# Patient Record
Sex: FEMALE | Race: WHITE | URBAN - METROPOLITAN AREA
[De-identification: names, ages, dates, MRNs, and addresses within clinical notes are randomized per-mention and may not be internally consistent; named-entity substitution may affect disease eponyms.]

---

## 2021-06-01 ENCOUNTER — DOCUMENTATION ONLY (OUTPATIENT)
Dept: FAMILY MEDICINE CLINIC | Age: 61
End: 2021-06-01

## 2021-06-01 ENCOUNTER — OFFICE VISIT (OUTPATIENT)
Dept: FAMILY MEDICINE CLINIC | Age: 61
End: 2021-06-01
Payer: MEDICAID

## 2021-06-01 ENCOUNTER — HOSPITAL ENCOUNTER (OUTPATIENT)
Dept: LAB | Age: 61
Discharge: HOME | End: 2021-06-01
Payer: MEDICAID

## 2021-06-01 VITALS
BODY MASS INDEX: 28.43 KG/M2 | OXYGEN SATURATION: 99 % | RESPIRATION RATE: 16 BRPM | TEMPERATURE: 98 F | DIASTOLIC BLOOD PRESSURE: 89 MMHG | HEIGHT: 59 IN | HEART RATE: 77 BPM | WEIGHT: 141 LBS | SYSTOLIC BLOOD PRESSURE: 126 MMHG

## 2021-06-01 DIAGNOSIS — Z79.899 CONTROLLED SUBSTANCE AGREEMENT SIGNED: ICD-10-CM

## 2021-06-01 DIAGNOSIS — F31.9 BIPOLAR 1 DISORDER (HCC): Primary | ICD-10-CM

## 2021-06-01 DIAGNOSIS — J44.9 CHRONIC OBSTRUCTIVE PULMONARY DISEASE, UNSPECIFIED COPD TYPE (HCC): ICD-10-CM

## 2021-06-01 DIAGNOSIS — G89.29 OTHER CHRONIC PAIN: ICD-10-CM

## 2021-06-01 DIAGNOSIS — F41.0 PANIC ATTACK: ICD-10-CM

## 2021-06-01 DIAGNOSIS — F41.9 ANXIETY: ICD-10-CM

## 2021-06-01 PROCEDURE — 80307 DRUG TEST PRSMV CHEM ANLYZR: CPT

## 2021-06-01 PROCEDURE — 99204 OFFICE O/P NEW MOD 45 MIN: CPT | Performed by: NURSE PRACTITIONER

## 2021-06-01 RX ORDER — FLUTICASONE FUROATE AND VILANTEROL TRIFENATATE 100; 25 UG/1; UG/1
1 POWDER RESPIRATORY (INHALATION) DAILY
Qty: 1 INHALER | Refills: 0 | Status: SHIPPED | OUTPATIENT
Start: 2021-06-01

## 2021-06-01 RX ORDER — FLUTICASONE PROPIONATE AND SALMETEROL 500; 50 UG/1; UG/1
1 POWDER RESPIRATORY (INHALATION) EVERY 12 HOURS
COMMUNITY
End: 2021-12-30 | Stop reason: SDUPTHER

## 2021-06-01 RX ORDER — TIZANIDINE HYDROCHLORIDE 4 MG/1
4 CAPSULE, GELATIN COATED ORAL
COMMUNITY

## 2021-06-01 RX ORDER — AZITHROMYCIN 250 MG/1
TABLET, FILM COATED ORAL
Qty: 6 TABLET | Refills: 0 | Status: SHIPPED | OUTPATIENT
Start: 2021-06-01 | End: 2021-06-06

## 2021-06-01 RX ORDER — OXYCODONE 13.5 MG/1
13.5 CAPSULE, EXTENDED RELEASE ORAL 2 TIMES DAILY
COMMUNITY
Start: 2021-05-20

## 2021-06-01 RX ORDER — FLUOXETINE HYDROCHLORIDE 40 MG/1
CAPSULE ORAL 2 TIMES DAILY
COMMUNITY

## 2021-06-01 RX ORDER — ALPRAZOLAM 1 MG/1
TABLET ORAL
COMMUNITY
End: 2021-12-15 | Stop reason: SDUPTHER

## 2021-06-01 RX ORDER — FLUTICASONE FUROATE AND VILANTEROL TRIFENATATE 100; 25 UG/1; UG/1
1 POWDER RESPIRATORY (INHALATION) 2 TIMES DAILY
COMMUNITY

## 2021-06-01 RX ORDER — QUETIAPINE FUMARATE 50 MG/1
100 TABLET, FILM COATED ORAL
COMMUNITY
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
## 2021-06-01 NOTE — PROGRESS NOTES
Call patient - let her know Pallavi Olsenr not covered by her insurance so she can just take the Advair she has at home BID for now until she sees pulm in a few weeks.

## 2021-06-02 LAB
AMPHETAMINE SCREEN, URINE, 701828: ABNORMAL NG/ML
AMPHETAMINES UR QL SCN: NEGATIVE NG/ML
BARBITURATES SCREEN, URINE, 701831: ABNORMAL NG/ML
BARBITURATES UR QL SCN: NEGATIVE NG/ML
BENZODIAZ UR QL SCN: NEGATIVE NG/ML
BENZODIAZEPINES SCREEN, URINE, 701832: ABNORMAL NG/ML
BUPRENORPHINE UR QL: NEGATIVE NG/ML
BUPRENORPHINE, URINE: ABNORMAL NG/ML
BZE UR QL SCN: NEGATIVE NG/ML
CANNABINOID SCREEN, URINE, 701833: ABNORMAL NG/ML
CANNABINOIDS UR QL SCN: NEGATIVE NG/ML
COCAINE METAB. SCREEN, URINE, 701834: ABNORMAL NG/ML
CREAT UR-MCNC: 66.3 MG/DL (ref 20–300)
METHADONE SCREEN, URINE, 701837: ABNORMAL NG/ML
METHADONE UR QL SCN: NEGATIVE NG/ML
OPIATE SCREEN, URINE, 701835: ABNORMAL NG/ML
OPIATES UR QL SCN: POSITIVE NG/ML
OXYCODONE+OXYMORPHONE UR QL SCN: POSITIVE NG/ML
OXYCODONE/OXYMORPHONE, URINE, 701858: ABNORMAL NG/ML
PCP UR QL: NEGATIVE NG/ML
PH UR: 6.5 [PH] (ref 4.5–8.9)
PHENCYCLIDINE SCREEN, URINE, 701836: ABNORMAL NG/ML
PLEASE NOTE:, 733163: ABNORMAL
PROPOXYPH UR QL SCN: NEGATIVE NG/ML
PROPOXYPHENE SCREEN, URINE, 701838: ABNORMAL NG/ML

## 2021-06-03 DIAGNOSIS — F41.9 ANXIETY: Primary | ICD-10-CM

## 2021-06-03 RX ORDER — ALPRAZOLAM 1 MG/1
1 TABLET ORAL
Qty: 20 TABLET | Refills: 0 | Status: SHIPPED | OUTPATIENT
Start: 2021-06-03

## 2021-12-14 ENCOUNTER — NURSE TRIAGE (OUTPATIENT)
Dept: OTHER | Facility: CLINIC | Age: 61
End: 2021-12-14

## 2021-12-14 NOTE — TELEPHONE ENCOUNTER
Received call from [facility] with Red Flag Complaint. Brief description of triage: S/p fall 9 days ago, rib pain, sob and coughing up blood. See below assessment. Triage indicates for patient to be seen in ED - patient does not have family or have the ability to drive, informed patient to call 911. Patient agreeable with plan and will go to ED but would also like to make a follow up appt with PCP. Warm transfer to [facility] for routine follow up scheduling per patient request.     Care advice provided, patient verbalizes understanding; denies any other questions or concerns; instructed to call back for any new or worsening symptoms. Attention Provider: Thank you for allowing me to participate in the care of your patient. The patient was connected to triage in response to information provided to the ECC. Please do not respond through this encounter as the response is not directed to a shared pool. Reason for Disposition   Coughing or spitting up blood    Answer Assessment - Initial Assessment Questions  1. MECHANISM: \"How did the injury happen? \"       had a fall when she hit the back of her right rib area on the cabinet    2. ONSET: \"When did the injury happen? \" (Minutes or hours ago)      9 days ago    3. LOCATION: \"Where on the chest is the injury located? \"      See above    4. APPEARANCE: \"What does the injury look like? \"      Unsure, hasn't been able to look at it in the mirror, states she thought it was bruised last week. 5. BLEEDING: \"Is there any bleeding now? If so, ask: How long has it been bleeding? \"      Denies    6. SEVERITY: Emmet Schilder difficulty with breathing? \"      Yes, states that she is having a hard time taking deep breath in because of the pain, pain is located right over area where she hit when she fell. She states that three days after she fell she began coughing up very dark brown sputum that looked like blood for about 4-5 days after.  Not having currently. States she has hx of COPD but this is related to the pain not her COPD    7. SIZE: For cuts, bruises, or swelling, ask: \"How large is it? \" (e.g., inches or centimeters)      Unsure    8. PAIN: \"Is there pain? \" If so, ask: \"How bad is the pain? \"   (e.g., Scale 1-10; or mild, moderate, severe)      12     9. TETNUS: For any breaks in the skin, ask: \"When was the last tetanus booster? \"      NA    10. PREGNANCY: \"Is there any chance you are pregnant? \" \"When was your last menstrual period? \"        NA    Protocols used: CHEST INJURY-ADULT-[place]

## 2021-12-15 ENCOUNTER — TELEPHONE (OUTPATIENT)
Dept: FAMILY MEDICINE CLINIC | Age: 61
End: 2021-12-15

## 2021-12-15 ENCOUNTER — OFFICE VISIT (OUTPATIENT)
Dept: FAMILY MEDICINE CLINIC | Age: 61
End: 2021-12-15

## 2021-12-15 VITALS
TEMPERATURE: 97.6 F | SYSTOLIC BLOOD PRESSURE: 108 MMHG | OXYGEN SATURATION: 99 % | HEIGHT: 59 IN | BODY MASS INDEX: 29.84 KG/M2 | DIASTOLIC BLOOD PRESSURE: 76 MMHG | HEART RATE: 74 BPM | WEIGHT: 148 LBS | RESPIRATION RATE: 16 BRPM

## 2021-12-15 DIAGNOSIS — F41.9 ANXIETY: ICD-10-CM

## 2021-12-15 DIAGNOSIS — S20.211A RIB CONTUSION, RIGHT, INITIAL ENCOUNTER: Primary | ICD-10-CM

## 2021-12-15 PROCEDURE — 99213 OFFICE O/P EST LOW 20 MIN: CPT | Performed by: NURSE PRACTITIONER

## 2021-12-15 RX ORDER — HYDROCODONE BITARTRATE AND ACETAMINOPHEN 5; 325 MG/1; MG/1
1 TABLET ORAL
Qty: 21 TABLET | Refills: 0 | Status: SHIPPED | OUTPATIENT
Start: 2021-12-15 | End: 2021-12-22

## 2021-12-15 RX ORDER — IBUPROFEN 800 MG/1
800 TABLET ORAL
Qty: 30 TABLET | Refills: 0 | Status: SHIPPED | OUTPATIENT
Start: 2021-12-15 | End: 2021-12-30

## 2021-12-15 RX ORDER — ALPRAZOLAM 1 MG/1
1 TABLET ORAL
Qty: 30 TABLET | Refills: 0 | Status: SHIPPED | OUTPATIENT
Start: 2021-12-15

## 2021-12-15 NOTE — TELEPHONE ENCOUNTER
Ardella Oppenheim from [employer] called on behalf of the pt. She would like to speak to [facility] in regards to medication that was prescribed to the pt today (hydrocodone). Ardella Oppenheim stated that the pt is taking suboxone as well. Ardella Oppenheim thinks that something doesn't seem right due to the pt switching pharmacies. Ardella Oppenheim wants to know if [facility] is aware .  Please advise

## 2021-12-15 NOTE — TELEPHONE ENCOUNTER
Please advise pharmacy that patient is no longer on suboxone and she switched pharmacies because she recently moved.

## 2021-12-15 NOTE — TELEPHONE ENCOUNTER
Bev from [facility] called on behalf of the pt. She would like to speak to Susan in regards to medication that was prescribed to the pt today (hydrocodone). Bev stated that the pt is taking suboxone as well. Bev thinks that something doesn't seem right due to the pt switching pharmacies. Bev wants to know if Susan is aware . Please advise

## 2021-12-15 NOTE — PROGRESS NOTES
CESAR Clark is a 57y.o. year old female who presents today for follow up from ER visit yesterday. About 10 days ago fell in her kitchen backwards into one of her cabinets and hit the right side of her back. She has been in a lot of pain since then and still has swelling in her back. She was directed by nursing triage to go to the ER yesterday which she did. Chest xray and rib series were unremarkable. She was given Norco for pain relief which helped. Was told to ask her PCP for a few day supply if it helped which it did so she would like that. Hurts when she laughs, takes a deep breath or twists. Has been wearing high waisted leggings which help to splint the pain a little         Past Medical History:   Diagnosis Date    Anxiety     Bipolar 1 disorder ([facility] [address]     History of uterine cancer     Insomnia     Osteoporosis     Restless legs syndrome     Scoliosis        Past Surgical History:   Procedure Laterality Date    HX HYSTERECTOMY      HX OTHER SURGICAL      C3,2 and 4 replacement        Social History     Tobacco Use    Smoking status: Former Smoker     Years: 50.00     Quit date:      Years since quittin.9    Smokeless tobacco: Never Used   Substance Use Topics    Alcohol use: Never    Drug use: Never         Current Outpatient Medications:     FLUoxetine (PROzac) 40 mg capsule, Take  by mouth two (2) times a day., Disp: , Rfl:     QUEtiapine (SEROqueL) 50 mg tablet, Take 100 mg by mouth nightly., Disp: , Rfl:     ALPRAZolam (Xanax) 1 mg tablet, Take  by mouth daily as needed for Anxiety. , Disp: , Rfl:     fluticasone propion-salmeteroL (Advair Diskus) 500-50 mcg/dose diskus inhaler, Take 1 Puff by inhalation every twelve (12) hours. , Disp: , Rfl:     ALPRAZolam (XANAX) 1 mg tablet, Take 1 Tablet by mouth daily as needed for Anxiety. (Patient not taking: Reported on 12/15/2021), Disp: 20 Tablet, Rfl: 0    tiZANidine (ZANAFLEX) 4 mg capsule, Take 4 mg by mouth nightly. 1 to 2 at night (Patient not taking: Reported on 12/15/2021), Disp: , Rfl:     fluticasone furoate-vilanteroL (Breo Ellipta) 100-25 mcg/dose inhaler, Take 1 Puff by inhalation two (2) times a day., Disp: , Rfl:     Xtampza ER 13.5 mg capsule, Take 13.5 mg by mouth two (2) times a day. (Patient not taking: Reported on 12/15/2021), Disp: , Rfl:     fluticasone furoate-vilanteroL (Breo Ellipta) 100-25 mcg/dose inhaler, Take 1 Puff by inhalation daily. (Patient not taking: Reported on 12/15/2021), Disp: 1 Inhaler, Rfl: 0     Allergies   Allergen Reactions    Pcn [Penicillins] Itching       Review of Systems   Constitutional: Negative for chills, fever and malaise/fatigue. Eyes: Negative for blurred vision and double vision. Respiratory: Negative for cough and shortness of breath. Cardiovascular: Negative for chest pain, palpitations and leg swelling. Musculoskeletal: Positive for joint pain (right posterior ribs). Neurological: Negative for dizziness and headaches. PE  /76 (BP 1 Location: Left upper arm, BP Patient Position: Sitting, BP Cuff Size: Adult)   Pulse 74   Temp 97.6 °F (36.4 °C) (Temporal)   Resp 16   Ht 4' 11\" (1.499 m)   Wt 148 lb (67.1 kg)   SpO2 99%   BMI 29.89 kg/m²     Physical Exam  Vitals reviewed. Constitutional:       General: She is not in acute distress. Appearance: Normal appearance. HENT:      Head: Normocephalic and atraumatic. Cardiovascular:      Rate and Rhythm: Normal rate and regular rhythm. Heart sounds: S1 normal and S2 normal. No murmur heard. No friction rub. No gallop. No S3 or S4 sounds. Pulmonary:      Effort: Pulmonary effort is normal.      Breath sounds: Normal breath sounds. No wheezing, rhonchi or rales. Musculoskeletal:        Back:       Right lower leg: No edema. Left lower leg: No edema.       Comments: Just larger than grapefruit sized area of soft tissue swelling over right posterior ribs, exquisitely tender to palpation, no ecchymosis   Skin:     General: Skin is warm and dry. Capillary Refill: Capillary refill takes less than 2 seconds. Neurological:      Mental Status: She is alert and oriented to person, place, and time. Assessment/Plan  1.  Rib contusion  norco 5-328 Q8H PRN #21 NR  Ibuprofen 800 Q8H PRN  Ice X20 mins 2-3X/day  Abdominal binder  Splint for coughing, sneezing  FU symptoms worsen or do not improve

## 2021-12-15 NOTE — PROGRESS NOTES
Leana Rodney is a 57 y.o. female (: 1960) presenting to address:    Chief Complaint   Patient presents with   Jono Holguin 21     from a fall       Vitals:    12/15/21 1013   BP: 108/76   Pulse: 74   Resp: 16   Temp: 97.6 °F (36.4 °C)   TempSrc: Temporal   SpO2: 99%   Weight: 148 lb (67.1 kg)   Height: 4' 11\" (1.499 m)   PainSc:  10 - Worst pain ever       Hearing/Vision:   No exam data present    Learning Assessment:   No flowsheet data found. Depression Screening:     3 most recent PHQ Screens 2021   PHQ Not Done Active Diagnosis of Depression or Bipolar Disorder   Little interest or pleasure in doing things Nearly every day   Feeling down, depressed, irritable, or hopeless Nearly every day   Total Score PHQ 2 6   Trouble falling or staying asleep, or sleeping too much Nearly every day   Feeling tired or having little energy Nearly every day   Poor appetite, weight loss, or overeating Nearly every day   Feeling bad about yourself - or that you are a failure or have let yourself or your family down Not at all   Trouble concentrating on things such as school, work, reading, or watching TV Not at all   Moving or speaking so slowly that other people could have noticed; or the opposite being so fidgety that others notice Not at all   Thoughts of being better off dead, or hurting yourself in some way Not at all   PHQ 9 Score 15   How difficult have these problems made it for you to do your work, take care of your home and get along with others Somewhat difficult     Fall Risk Assessment:     Fall Risk Assessment, last 12 mths 2021   Able to walk? Yes   Fall in past 12 months? 1   Do you feel unsteady? 0   Are you worried about falling 0   Is TUG test greater than 12 seconds? 0   Is the gait abnormal? 0   Number of falls in past 12 months 2   Fall with injury? 0     Abuse Screening:     Abuse Screening Questionnaire 2021   Do you ever feel afraid of your partner?  N   Are you in a relationship with someone who physically or mentally threatens you? N   Is it safe for you to go home? Y     ADL Assessment:   No flowsheet data found. Coordination of Care Questionaire:   1. \"Have you been to the ER, urgent care clinic since your last visit? Hospitalized since your last visit? \" Yes Where: [facility] December 14th for bruise on back    2. \"Have you seen or consulted any other health care providers outside of the [address] [place] [address] since your last visit? \" No     3. For patients aged 39-70: Has the patient had a colonoscopy? No     If the patient is female:    4. For patients aged 41-77: Has the patient had a mammogram within the past 2 years? No    5. For patients aged 21-65: Has the patient had a pap smear? No    Advanced Directive:   1. Do you have an Advanced Directive? NO    2. Would you like information on Advanced Directives?  NO

## 2021-12-30 ENCOUNTER — VIRTUAL VISIT (OUTPATIENT)
Dept: FAMILY MEDICINE CLINIC | Age: 61
End: 2021-12-30
Payer: MEDICAID

## 2021-12-30 DIAGNOSIS — J44.1 ACUTE EXACERBATION OF CHRONIC OBSTRUCTIVE PULMONARY DISEASE (COPD) (HCC): Primary | ICD-10-CM

## 2021-12-30 DIAGNOSIS — J30.9 CHRONIC ALLERGIC RHINITIS: ICD-10-CM

## 2021-12-30 DIAGNOSIS — F17.201 MILD TOBACCO USE DISORDER, IN SUSTAINED REMISSION: ICD-10-CM

## 2021-12-30 DIAGNOSIS — G47.34 NOCTURNAL HYPOXIA: ICD-10-CM

## 2021-12-30 PROCEDURE — 99214 OFFICE O/P EST MOD 30 MIN: CPT | Performed by: STUDENT IN AN ORGANIZED HEALTH CARE EDUCATION/TRAINING PROGRAM

## 2021-12-30 RX ORDER — LEVOFLOXACIN 500 MG/1
500 TABLET, FILM COATED ORAL DAILY
Qty: 10 TABLET | Refills: 0 | Status: SHIPPED | OUTPATIENT
Start: 2021-12-30 | End: 2022-01-19 | Stop reason: SDUPTHER

## 2021-12-30 RX ORDER — ALBUTEROL SULFATE 90 UG/1
1 AEROSOL, METERED RESPIRATORY (INHALATION)
Qty: 2 EACH | Refills: 11 | Status: SHIPPED | OUTPATIENT
Start: 2021-12-30

## 2021-12-30 RX ORDER — FLUTICASONE PROPIONATE AND SALMETEROL 500; 50 UG/1; UG/1
1 POWDER RESPIRATORY (INHALATION) EVERY 12 HOURS
Qty: 60 EACH | Refills: 1 | Status: SHIPPED | OUTPATIENT
Start: 2021-12-30

## 2021-12-30 RX ORDER — BENZONATATE 200 MG/1
200 CAPSULE ORAL
Qty: 30 CAPSULE | Refills: 0 | Status: SHIPPED | OUTPATIENT
Start: 2021-12-30 | End: 2022-01-09

## 2021-12-30 NOTE — PROGRESS NOTES
Note to patient:  The purpose of this note is to communicate optimally with the other physicians / APCs involved in your care. It is written using standard medical terminology. If you have questions regarding the details of the note, please contact my office to schedule an appointment to address your questions. [facility]  Primary Care Office Visit - Telemedicine Problem-Oriented Note    : 1960   Paulo Miller is a 57 y.o. female presenting for  No chief complaint on file. Assessment/Plan:       ICD-10-CM ICD-9-CM   1. Acute exacerbation of chronic obstructive pulmonary disease (COPD) ([facility] [address]  J44.1 491.21   2. Nocturnal hypoxia  G47.34 327.24   3. Chronic allergic rhinitis  J30.9 477.9   4. Mild tobacco use disorder, in sustained remission  F17.201 305.1     #AECOPD- poorly controlled COPD with frequent exacerbations  #Tobacco use disorder - in remission, Quit smoking 1year ago  Last flare - 2021; Hospitalized once last year  In review of medications, reports did not have rescue inhaler (albuterol) at home. Multiple preventative inhalers on medication list but Patient only has spiriva. Reviewed optimal use of preventative and rescue inhaler and instructed to resume daily preventative inhaler and use Albuterol q4hrs prn until able to follow up with PCP and review if continued use needed or not. Discussed if sx not improving or rescue treatment becoming less helpful, will need in person evaluation. With #Chronic allergic rhinitis - uncontrolled; not on any maintenance medication  Reviewed optimal use of OTC agents for symptom control (daily antihistamine and daily nasal steroid) and instructed to start and continue at least a week past resolution of current illness then discuss with PCP if continued use needed. #Nocturnal hypoxia  Reports prior dx of nocturnal hypoxia treated with home O2 at night. No longer has O2 at home.  Agreeable to eval with pulm/sleep specialist.      Orders Placed This Encounter    REFERRAL TO PULMONARY DISEASE     Referral Priority:   Routine     Referral Type:   Consultation     Referral Reason:   Specialty Services Required     Number of Visits Requested:   1    levoFLOXacin (LEVAQUIN) 500 mg tablet     Sig: Take 1 Tablet by mouth daily. Can stop after 7 days if better. If still having symptoms, can extend course to 10 days total.  Indications: AE COPD     Dispense:  10 Tablet     Refill:  0    benzonatate (TESSALON) 200 mg capsule     Sig: Take 1 Capsule by mouth three (3) times daily as needed for Cough for up to 10 days. Dispense:  30 Capsule     Refill:  0    albuterol (PROVENTIL HFA, VENTOLIN HFA, PROAIR HFA) 90 mcg/actuation inhaler     Sig: Take 1 Puff by inhalation every four (4) hours as needed for Wheezing, Shortness of Breath or Cough. Goal to reduce need for use to less than twice weekly at baseline. Dispense:  2 Each     Refill:  11    fluticasone propion-salmeteroL (Advair Diskus) 500-50 mcg/dose diskus inhaler     Sig: Take 1 Puff by inhalation every twelve (12) hours. Indications: bronchospasm prevention with COPD     Dispense:  60 Each     Refill:  1     Follow-up and Dispositions    · Return for when feeling better to follow up with PCP . Reviewed management plan & instructions with patient, who voiced understanding. Subjective   History:   Lj Dickens is a 57 y.o. female presenting to address:  No chief complaint on file. Cold/flu symtpoms with hot sweats - started Sunday ; HA became more than normal, rhinorrhea (new), no energy   Was around sick contact 12/24/21  Throat feels off   More SOB, wheezing    Review of Systems:     A comprehensive review of systems was negative except for that written in the HPI and A/P         Objective     Physical Assessment:     Physical Exam  Nursing note reviewed. Constitutional:       General: She is not in acute distress.   Pulmonary: Effort: Pulmonary effort is normal. No respiratory distress. Neurological:      Mental Status: She is alert and oriented to person, place, and time. Psychiatric:         Mood and Affect: Mood normal.         Behavior: Behavior normal.              Nyasia Perez, who was evaluated through a synchronous (real-time) audio-video encounter, and/or her healthcare decision maker, is aware that it is a billable service, with coverage as determined by her insurance carrier. She provided verbal consent to proceed: Yes, and patient identification was verified. This visit was conducted pursuant to the emergency declaration under the [address] [place] [address], [address] [place] [address] authority and the [employer] General Act. A caregiver was present when appropriate. Ability to conduct physical exam was limited. The patient was located in a state where the provider was credentialed to provide care.      Berny Marcum DO  Family Medicine  12/30/2021

## 2022-01-03 ENCOUNTER — TELEPHONE (OUTPATIENT)
Dept: FAMILY MEDICINE CLINIC | Age: 62
End: 2022-01-03

## 2022-01-03 NOTE — TELEPHONE ENCOUNTER
In review of Ms Beltrán's chart it looks like she is on Suboxone with Pain management so it is not appropriate to add additional opioids without discussing with them or follow up but there are a lot of over the counter treatments that she can and should try to help with the pain to keep her from restricting her breathing with rib fractures. Can purchase topical treatments like Lidocaine patches or  Volteran gel 1% (also known as Diclofenac) at the pharmacy without a prescription. Volteran gel is the same kind of medication as Ibuprofen which works well for inflammation without the same side effects that come with oral version of the medication. It comes in a tube, usually 100g and can be used as needed. You can also use Tylenol (Acetaminophen) 500-650mg every 6 hours as needed for pain. Caution with long term regular use of NSAID (Ex. Advil/Iburprofen, Aleve/Naproxen). This medication can be upsetting to the stomach as well as contribute to increased blood pressure.

## 2022-01-03 NOTE — TELEPHONE ENCOUNTER
Spoke to patient she states she is no longer on the Suboxone she started it back in Oct so she wouldnt have withdrawals while coming off the strong pain medications she had been on x 40 some years.      Please advise in regards to the [facility]
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
## 2022-01-04 NOTE — TELEPHONE ENCOUNTER
Patient can try the OTC treatments I listed and discuss whether or not opioids should be added to the treatment when she establishes care. I will not be able to monitor her as she is assigned to another provider and it would not be appropriate for me to send a one time script without discussing with her and being able to follow.

## 2022-01-04 NOTE — TELEPHONE ENCOUNTER
Patient is KIM and requesting pain management referral; pt scheduled on:   Future Appointments   Date Time Provider Claribel Bruner   1/5/2022  8:30 AM Jerica Friedman MD [facility]

## 2022-01-04 NOTE — TELEPHONE ENCOUNTER
----- Message from Josesito Berman sent at 1/3/2022  4:34 PM EST -----  Subject: Message to Provider    QUESTIONS  Information for Provider? Pt is returning a call she received regarding   her medication. Please contact patient as soon as possible.  ---------------------------------------------------------------------------  --------------  CALL BACK INFO  What is the best way for the office to contact you? OK to leave message on   voicemail  Preferred Call Back Phone Number?  [phone]  ---------------------------------------------------------------------------  --------------  SCRIPT ANSWERS  undefined

## 2022-01-19 ENCOUNTER — TELEPHONE (OUTPATIENT)
Dept: FAMILY MEDICINE CLINIC | Age: 62
End: 2022-01-19

## 2022-01-19 ENCOUNTER — VIRTUAL VISIT (OUTPATIENT)
Dept: FAMILY MEDICINE CLINIC | Age: 62
End: 2022-01-19
Payer: MEDICAID

## 2022-01-19 DIAGNOSIS — J44.1 ACUTE EXACERBATION OF CHRONIC OBSTRUCTIVE PULMONARY DISEASE (COPD) (HCC): Primary | ICD-10-CM

## 2022-01-19 DIAGNOSIS — Z20.822 CONTACT WITH AND (SUSPECTED) EXPOSURE TO COVID-19: ICD-10-CM

## 2022-01-19 PROCEDURE — 99213 OFFICE O/P EST LOW 20 MIN: CPT | Performed by: INTERNAL MEDICINE

## 2022-01-19 RX ORDER — BENZONATATE 200 MG/1
200 CAPSULE ORAL
Qty: 20 CAPSULE | Refills: 0 | Status: SHIPPED | OUTPATIENT
Start: 2022-01-19

## 2022-01-19 RX ORDER — PREDNISONE 10 MG/1
TABLET ORAL
Qty: 21 TABLET | Refills: 0 | Status: SHIPPED | OUTPATIENT
Start: 2022-01-19

## 2022-01-19 RX ORDER — LEVOFLOXACIN 500 MG/1
500 TABLET, FILM COATED ORAL DAILY
Qty: 7 TABLET | Refills: 0 | Status: SHIPPED | OUTPATIENT
Start: 2022-01-19 | End: 2022-02-07 | Stop reason: ALTCHOICE

## 2022-01-19 NOTE — PROGRESS NOTES
Valdo Mckeon is a 57 y.o. female who was seen by synchronous (real-time) audio-video technology on 1/19/2022 for Cough        Assessment & Plan:   Diagnoses and all orders for this visit:    1. Acute exacerbation of chronic obstructive pulmonary disease (COPD) ([facility])  -     levoFLOXacin (LEVAQUIN) 500 mg tablet; Take 1 Tablet by mouth daily. Can stop after 7 days if better. If still having symptoms, can extend course to 10 days total.  Indications: AE COPD  -     predniSONE (STERAPRED DS) 10 mg dose pack; See administration instruction per 10mg dose pack  -     benzonatate (TESSALON) 200 mg capsule; Take 1 Capsule by mouth three (3) times daily as needed for Cough. 2. Contact with and (suspected) exposure to covid-19, need to r/o covid-19. Advised pt to get covid test done soon  Advised pt to call 911 or go to the ER if symptoms worsen  Advised pt to get pulse oximeter and monitor her O2 sats daily and need to go to the ER is under 90% consistently. 712  Subjective:   C/o started with increasing cough, productive of yellow/greenish sputum about 4 days ago, she also has more wheezing, and has been using her albuterol inhaler daily, she has sob with her copd and that is at baseline, she is using her advair daily. She stated that her daughter and son-in-law were tested positive for covid-19 4 days ago, she has been in self- quarantine since then, she denies any fever/chills, has slight nasal congestion, no joints pain. Prior to Admission medications    Medication Sig Start Date End Date Taking? Authorizing Provider   levoFLOXacin (LEVAQUIN) 500 mg tablet Take 1 Tablet by mouth daily. Can stop after 7 days if better.  If still having symptoms, can extend course to 10 days total.  Indications: AE COPD 1/19/22  Yes Gale Faith MD   predniSONE (STERAPRED DS) 10 mg dose pack See administration instruction per 10mg dose pack 1/19/22  Yes Felicia ZHANG MD   benzonatate (TESSALON) 200 mg capsule Take 1 Capsule by mouth three (3) times daily as needed for Cough. 1/19/22  Yes oBoker Powers MD   albuterol (PROVENTIL HFA, VENTOLIN HFA, PROAIR HFA) 90 mcg/actuation inhaler Take 1 Puff by inhalation every four (4) hours as needed for Wheezing, Shortness of Breath or Cough. Goal to reduce need for use to less than twice weekly at baseline. 12/30/21   Erin Gallo H, DO   fluticasone propion-salmeteroL (Advair Diskus) 500-50 mcg/dose diskus inhaler Take 1 Puff by inhalation every twelve (12) hours. Indications: bronchospasm prevention with COPD 12/30/21   Erin Gallo, DO   ALPRAZolam (Xanax) 1 mg tablet Take 1 Tablet by mouth daily as needed for Anxiety. 12/15/21   Jackie Doss NP   ALPRAZolam Cynthia Balboa) 1 mg tablet Take 1 Tablet by mouth daily as needed for Anxiety. Patient not taking: Reported on 12/15/2021 6/3/21   Jackie Doss NP   tiZANidine (ZANAFLEX) 4 mg capsule Take 4 mg by mouth nightly. 1 to 2 at night  Patient not taking: Reported on 12/15/2021    Provider, Historical   FLUoxetine (PROzac) 40 mg capsule Take  by mouth two (2) times a day. Provider, Historical   QUEtiapine (SEROqueL) 50 mg tablet Take 100 mg by mouth nightly. Provider, Historical   fluticasone furoate-vilanteroL (Breo Ellipta) 100-25 mcg/dose inhaler Take 1 Puff by inhalation two (2) times a day. Provider, Historical   Xtampza ER 13.5 mg capsule Take 13.5 mg by mouth two (2) times a day. Patient not taking: Reported on 12/15/2021 5/20/21   Provider, Historical   fluticasone furoate-vilanteroL (Breo Ellipta) 100-25 mcg/dose inhaler Take 1 Puff by inhalation daily. Patient not taking: Reported on 12/15/2021 6/1/21   Jackie Doss NP     There are no problems to display for this patient.     Past Medical History:   Diagnosis Date    Anxiety     Bipolar 1 disorder ([address]     History of uterine cancer     Insomnia     Osteoporosis     Restless legs syndrome     Scoliosis Social History     Tobacco Use    Smoking status: Former Smoker     Years: 50.00     Quit date:      Years since quittin.0    Smokeless tobacco: Never Used   Substance Use Topics    Alcohol use: Never       Review of Systems   Constitutional: Negative for chills and fever. HENT: Positive for congestion. Negative for sore throat. Respiratory: Positive for cough, sputum production and wheezing. Negative for hemoptysis. Gastrointestinal: Negative for abdominal pain and diarrhea. Objective:   No flowsheet data found. General: alert, cooperative, no distress   Mental  status: normal mood, behavior, speech, dress, motor activity, and thought processes, able to follow commands   HENT: NCAT   Neck: no visualized mass   Resp: no respiratory distress   Neuro: no gross deficits   Skin: no discoloration or lesions of concern on visible areas   Psychiatric: normal affect, consistent with stated mood, no evidence of hallucinations     Additional exam findings: We discussed the expected course, resolution and complications of the diagnosis(es) in detail. Medication risks, benefits, costs, interactions, and alternatives were discussed as indicated. I advised her to contact the office if her condition worsens, changes or fails to improve as anticipated. She expressed understanding with the diagnosis(es) and plan. Dario Murrell, was evaluated through a synchronous (real-time) audio-video encounter. The patient (or guardian if applicable) is aware that this is a billable service, which includes applicable co-pays. Verbal consent to proceed has been obtained. The visit was conducted pursuant to the emergency declaration under the [address] [place] [address], [address] [place] [address] authority and the [employer] and [employer] General Act. Patient identification was verified, and a caregiver was present when appropriate.  The patient was located at home in a state where the provider was licensed to provide care.     Nakia Trevino MD

## 2022-07-31 NOTE — TELEPHONE ENCOUNTER
Patient is on multiple preventive inhalers, not sure which one she is using. Can you see if this refill is correct and schedule for KIM?

## 2022-08-01 RX ORDER — FLUTICASONE PROPIONATE AND SALMETEROL 50; 500 UG/1; UG/1
POWDER RESPIRATORY (INHALATION)
Qty: 60 EACH | Refills: 0 | OUTPATIENT
Start: 2022-08-01

## 2022-08-01 NOTE — TELEPHONE ENCOUNTER
Last seen for sick visit on 1/19/22 patient has not established care w/ new PCP as of yet    Last filled 12/30/21 qty 60 w/ 1 refill     No future appointments.     Left message informing that appt needs to be scheduled for KIM prior to refills

## 2022-12-05 ENCOUNTER — OFFICE (OUTPATIENT)
Dept: URBAN - METROPOLITAN AREA CLINIC 18 | Facility: CLINIC | Age: 62
End: 2022-12-05

## 2022-12-05 VITALS
OXYGEN SATURATION: 97 % | DIASTOLIC BLOOD PRESSURE: 82 MMHG | HEIGHT: 57 IN | SYSTOLIC BLOOD PRESSURE: 116 MMHG | WEIGHT: 145 LBS | HEART RATE: 88 BPM

## 2022-12-05 DIAGNOSIS — R06.02 SHORTNESS OF BREATH: ICD-10-CM

## 2022-12-05 DIAGNOSIS — B18.2 CHRONIC VIRAL HEPATITIS C: ICD-10-CM

## 2022-12-05 DIAGNOSIS — R19.4 CHANGE IN BOWEL HABIT: ICD-10-CM

## 2022-12-05 DIAGNOSIS — R53.82 CHRONIC FATIGUE, UNSPECIFIED: ICD-10-CM

## 2022-12-05 PROCEDURE — 99203 OFFICE O/P NEW LOW 30 MIN: CPT | Performed by: INTERNAL MEDICINE

## 2023-03-03 ENCOUNTER — AMBULATORY SURGICAL CENTER (OUTPATIENT)
Dept: URBAN - METROPOLITAN AREA SURGERY 7 | Facility: SURGERY | Age: 63
End: 2023-03-03

## 2023-08-25 ENCOUNTER — OFFICE (OUTPATIENT)
Dept: URBAN - METROPOLITAN AREA CLINIC 18 | Facility: CLINIC | Age: 63
End: 2023-08-25
Payer: COMMERCIAL

## 2023-08-25 VITALS
HEIGHT: 57 IN | DIASTOLIC BLOOD PRESSURE: 72 MMHG | WEIGHT: 160 LBS | SYSTOLIC BLOOD PRESSURE: 118 MMHG | HEART RATE: 96 BPM | OXYGEN SATURATION: 95 %

## 2023-08-25 DIAGNOSIS — B18.2 CHRONIC VIRAL HEPATITIS C: ICD-10-CM

## 2023-08-25 DIAGNOSIS — R06.02 SHORTNESS OF BREATH: ICD-10-CM

## 2023-08-25 PROCEDURE — 99214 OFFICE O/P EST MOD 30 MIN: CPT | Performed by: INTERNAL MEDICINE
